# Patient Record
Sex: FEMALE | Race: BLACK OR AFRICAN AMERICAN | NOT HISPANIC OR LATINO | Employment: STUDENT | URBAN - METROPOLITAN AREA
[De-identification: names, ages, dates, MRNs, and addresses within clinical notes are randomized per-mention and may not be internally consistent; named-entity substitution may affect disease eponyms.]

---
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
## 2019-06-21 ENCOUNTER — TELEPHONE (OUTPATIENT)
Dept: PEDIATRICS | Facility: CLINIC | Age: 19
End: 2019-06-21

## 2019-06-21 NOTE — TELEPHONE ENCOUNTER
----- Message from Rachel Swan sent at 6/21/2019  3:29 PM CDT -----  Contact: Gaby pt  Type: Needs Medical Advice    Who Called:  Gaby  Best Call Back Number: [phone]  Additional Information: Pls call Gaby regarding her obtaining a copy of her immunization records. [place]  [place].

## 2019-06-21 NOTE — TELEPHONE ENCOUNTER
Advised pt immunizations not up to date. Will mail record of received immunizations to : [phone] [facility] [id number] as requested.